# Patient Record
Sex: MALE | Race: BLACK OR AFRICAN AMERICAN | NOT HISPANIC OR LATINO | Employment: OTHER | URBAN - METROPOLITAN AREA
[De-identification: names, ages, dates, MRNs, and addresses within clinical notes are randomized per-mention and may not be internally consistent; named-entity substitution may affect disease eponyms.]

---

## 2019-01-02 ENCOUNTER — HOSPITAL ENCOUNTER (OUTPATIENT)
Facility: HOSPITAL | Age: 59
Discharge: HOME | End: 2019-01-03
Attending: EMERGENCY MEDICINE | Admitting: EMERGENCY MEDICINE
Payer: COMMERCIAL

## 2019-01-02 DIAGNOSIS — M19.90 CHRONIC ARTHRITIS: ICD-10-CM

## 2019-01-02 DIAGNOSIS — N17.9 AKI (ACUTE KIDNEY INJURY): Primary | ICD-10-CM

## 2019-01-02 DIAGNOSIS — N17.9 ACUTE RENAL FAILURE, UNSPECIFIED ACUTE RENAL FAILURE TYPE: ICD-10-CM

## 2019-01-02 DIAGNOSIS — E86.0 DEHYDRATION: ICD-10-CM

## 2019-01-02 DIAGNOSIS — E83.52 HYPERCALCEMIA: ICD-10-CM

## 2019-01-02 PROBLEM — M25.50 ARTHRALGIA: Status: ACTIVE | Noted: 2019-01-02

## 2019-01-02 LAB
ALBUMIN SERPL BCP-MCNC: 3.2 G/DL
ALP SERPL-CCNC: 136 U/L
ALT SERPL W/O P-5'-P-CCNC: 42 U/L
AMORPH CRY URNS QL MICRO: ABNORMAL
AMPHET+METHAMPHET UR QL: NEGATIVE
ANION GAP SERPL CALC-SCNC: 11 MMOL/L
ANION GAP SERPL CALC-SCNC: 18 MMOL/L
AST SERPL-CCNC: 50 U/L
BACTERIA #/AREA URNS HPF: ABNORMAL /HPF
BARBITURATES UR QL SCN>200 NG/ML: NEGATIVE
BASOPHILS # BLD AUTO: 0.01 K/UL
BASOPHILS NFR BLD: 0.1 %
BENZODIAZ UR QL SCN>200 NG/ML: NEGATIVE
BILIRUB SERPL-MCNC: 0.4 MG/DL
BILIRUB UR QL STRIP: ABNORMAL
BUN SERPL-MCNC: 55 MG/DL
BUN SERPL-MCNC: 61 MG/DL
BZE UR QL SCN: NEGATIVE
CALCIUM SERPL-MCNC: 10.7 MG/DL
CALCIUM SERPL-MCNC: 12.6 MG/DL
CANNABINOIDS UR QL SCN: NEGATIVE
CHLORIDE SERPL-SCNC: 104 MMOL/L
CHLORIDE SERPL-SCNC: 105 MMOL/L
CK SERPL-CCNC: 23 U/L
CLARITY UR: CLEAR
CO2 SERPL-SCNC: 16 MMOL/L
CO2 SERPL-SCNC: 19 MMOL/L
COLOR UR: YELLOW
CREAT SERPL-MCNC: 2 MG/DL
CREAT SERPL-MCNC: 2.1 MG/DL
CREAT UR-MCNC: 165.4 MG/DL
DIFFERENTIAL METHOD: ABNORMAL
EOSINOPHIL # BLD AUTO: 0.1 K/UL
EOSINOPHIL NFR BLD: 1.5 %
ERYTHROCYTE [DISTWIDTH] IN BLOOD BY AUTOMATED COUNT: 14.5 %
EST. GFR  (AFRICAN AMERICAN): 39 ML/MIN/1.73 M^2
EST. GFR  (AFRICAN AMERICAN): 42 ML/MIN/1.73 M^2
EST. GFR  (NON AFRICAN AMERICAN): 34 ML/MIN/1.73 M^2
EST. GFR  (NON AFRICAN AMERICAN): 36 ML/MIN/1.73 M^2
ETHANOL SERPL-MCNC: <10 MG/DL
GLUCOSE SERPL-MCNC: 103 MG/DL
GLUCOSE SERPL-MCNC: 129 MG/DL
GLUCOSE UR QL STRIP: NEGATIVE
GRAN CASTS #/AREA URNS LPF: 1 /LPF
HCT VFR BLD AUTO: 31.6 %
HGB BLD-MCNC: 10.6 G/DL
HGB UR QL STRIP: ABNORMAL
HYALINE CASTS #/AREA URNS LPF: 0 /LPF
KETONES UR QL STRIP: ABNORMAL
LEUKOCYTE ESTERASE UR QL STRIP: NEGATIVE
LYMPHOCYTES # BLD AUTO: 1.7 K/UL
LYMPHOCYTES NFR BLD: 19.8 %
MAGNESIUM SERPL-MCNC: 2.7 MG/DL
MCH RBC QN AUTO: 28.3 PG
MCHC RBC AUTO-ENTMCNC: 33.5 G/DL
MCV RBC AUTO: 84 FL
METHADONE UR QL SCN>300 NG/ML: NEGATIVE
MICROSCOPIC COMMENT: ABNORMAL
MONOCYTES # BLD AUTO: 0.7 K/UL
MONOCYTES NFR BLD: 7.7 %
NEUTROPHILS # BLD AUTO: 6.1 K/UL
NEUTROPHILS NFR BLD: 70.9 %
NITRITE UR QL STRIP: NEGATIVE
OPIATES UR QL SCN: NEGATIVE
PCP UR QL SCN>25 NG/ML: NEGATIVE
PH UR STRIP: 6 [PH] (ref 5–8)
PHOSPHATE SERPL-MCNC: 5.1 MG/DL
PLATELET # BLD AUTO: 341 K/UL
PMV BLD AUTO: 11 FL
POTASSIUM SERPL-SCNC: 4.8 MMOL/L
POTASSIUM SERPL-SCNC: 4.9 MMOL/L
PROT SERPL-MCNC: 9.4 G/DL
PROT UR QL STRIP: ABNORMAL
RBC # BLD AUTO: 3.75 M/UL
RBC #/AREA URNS HPF: 0 /HPF (ref 0–4)
SODIUM SERPL-SCNC: 135 MMOL/L
SODIUM SERPL-SCNC: 138 MMOL/L
SP GR UR STRIP: >=1.03 (ref 1–1.03)
TOXICOLOGY INFORMATION: NORMAL
URN SPEC COLLECT METH UR: ABNORMAL
UROBILINOGEN UR STRIP-ACNC: NEGATIVE EU/DL
WBC # BLD AUTO: 8.59 K/UL
WBC #/AREA URNS HPF: 0 /HPF (ref 0–5)

## 2019-01-02 PROCEDURE — 97530 THERAPEUTIC ACTIVITIES: CPT | Performed by: PHYSICAL THERAPIST

## 2019-01-02 PROCEDURE — 83735 ASSAY OF MAGNESIUM: CPT

## 2019-01-02 PROCEDURE — G8987 SELF CARE CURRENT STATUS: HCPCS | Mod: CK | Performed by: PHYSICAL THERAPIST

## 2019-01-02 PROCEDURE — 25000003 PHARM REV CODE 250: Performed by: NURSE PRACTITIONER

## 2019-01-02 PROCEDURE — 97161 PT EVAL LOW COMPLEX 20 MIN: CPT

## 2019-01-02 PROCEDURE — 80048 BASIC METABOLIC PNL TOTAL CA: CPT

## 2019-01-02 PROCEDURE — 25000003 PHARM REV CODE 250: Performed by: EMERGENCY MEDICINE

## 2019-01-02 PROCEDURE — 99285 EMERGENCY DEPT VISIT HI MDM: CPT | Mod: 25

## 2019-01-02 PROCEDURE — 80320 DRUG SCREEN QUANTALCOHOLS: CPT

## 2019-01-02 PROCEDURE — G0378 HOSPITAL OBSERVATION PER HR: HCPCS

## 2019-01-02 PROCEDURE — 80053 COMPREHEN METABOLIC PANEL: CPT

## 2019-01-02 PROCEDURE — G8988 SELF CARE GOAL STATUS: HCPCS | Mod: CJ | Performed by: PHYSICAL THERAPIST

## 2019-01-02 PROCEDURE — G8978 MOBILITY CURRENT STATUS: HCPCS | Mod: CJ

## 2019-01-02 PROCEDURE — 81000 URINALYSIS NONAUTO W/SCOPE: CPT | Mod: 59

## 2019-01-02 PROCEDURE — 36415 COLL VENOUS BLD VENIPUNCTURE: CPT

## 2019-01-02 PROCEDURE — 96360 HYDRATION IV INFUSION INIT: CPT

## 2019-01-02 PROCEDURE — 97165 OT EVAL LOW COMPLEX 30 MIN: CPT | Performed by: PHYSICAL THERAPIST

## 2019-01-02 PROCEDURE — 85025 COMPLETE CBC W/AUTO DIFF WBC: CPT

## 2019-01-02 PROCEDURE — 96361 HYDRATE IV INFUSION ADD-ON: CPT

## 2019-01-02 PROCEDURE — 80307 DRUG TEST PRSMV CHEM ANLYZR: CPT

## 2019-01-02 PROCEDURE — 82550 ASSAY OF CK (CPK): CPT

## 2019-01-02 PROCEDURE — G8979 MOBILITY GOAL STATUS: HCPCS | Mod: CI

## 2019-01-02 PROCEDURE — 97116 GAIT TRAINING THERAPY: CPT

## 2019-01-02 PROCEDURE — 84100 ASSAY OF PHOSPHORUS: CPT

## 2019-01-02 RX ORDER — FAMOTIDINE 20 MG/1
20 TABLET, FILM COATED ORAL 2 TIMES DAILY
Status: DISCONTINUED | OUTPATIENT
Start: 2019-01-02 | End: 2019-01-02

## 2019-01-02 RX ORDER — SODIUM CHLORIDE 9 MG/ML
INJECTION, SOLUTION INTRAVENOUS CONTINUOUS
Status: DISCONTINUED | OUTPATIENT
Start: 2019-01-02 | End: 2019-01-03 | Stop reason: HOSPADM

## 2019-01-02 RX ORDER — RAMELTEON 8 MG/1
8 TABLET ORAL NIGHTLY PRN
Status: DISCONTINUED | OUTPATIENT
Start: 2019-01-02 | End: 2019-01-03 | Stop reason: HOSPADM

## 2019-01-02 RX ORDER — DULOXETIN HYDROCHLORIDE 60 MG/1
CAPSULE, DELAYED RELEASE ORAL
COMMUNITY
Start: 2018-10-29

## 2019-01-02 RX ORDER — IBUPROFEN 100 MG/5ML
1000 SUSPENSION, ORAL (FINAL DOSE FORM) ORAL
COMMUNITY

## 2019-01-02 RX ORDER — HYDROCODONE BITARTRATE AND ACETAMINOPHEN 10; 325 MG/1; MG/1
1 TABLET ORAL
Status: COMPLETED | OUTPATIENT
Start: 2019-01-02 | End: 2019-01-02

## 2019-01-02 RX ORDER — HYDROXYZINE HYDROCHLORIDE 50 MG/1
TABLET, FILM COATED ORAL
COMMUNITY
Start: 2018-11-01

## 2019-01-02 RX ORDER — NAPROXEN 500 MG/1
500 TABLET ORAL
Status: COMPLETED | OUTPATIENT
Start: 2019-01-02 | End: 2019-01-02

## 2019-01-02 RX ORDER — QUETIAPINE FUMARATE 50 MG/1
TABLET, FILM COATED ORAL
COMMUNITY
Start: 2018-10-29

## 2019-01-02 RX ORDER — ASCORBIC ACID 500 MG
1000 TABLET ORAL DAILY
Status: DISCONTINUED | OUTPATIENT
Start: 2019-01-02 | End: 2019-01-03 | Stop reason: HOSPADM

## 2019-01-02 RX ORDER — AMLODIPINE BESYLATE 5 MG/1
TABLET ORAL
COMMUNITY
Start: 2018-10-29

## 2019-01-02 RX ORDER — HYDROCODONE BITARTRATE AND ACETAMINOPHEN 5; 325 MG/1; MG/1
1 TABLET ORAL EVERY 4 HOURS PRN
Status: DISCONTINUED | OUTPATIENT
Start: 2019-01-02 | End: 2019-01-03 | Stop reason: HOSPADM

## 2019-01-02 RX ORDER — DULOXETIN HYDROCHLORIDE 30 MG/1
60 CAPSULE, DELAYED RELEASE ORAL DAILY
Status: DISCONTINUED | OUTPATIENT
Start: 2019-01-02 | End: 2019-01-03 | Stop reason: HOSPADM

## 2019-01-02 RX ORDER — ESZOPICLONE 1 MG/1
3 TABLET, FILM COATED ORAL NIGHTLY PRN
COMMUNITY

## 2019-01-02 RX ORDER — ONDANSETRON 2 MG/ML
4 INJECTION INTRAMUSCULAR; INTRAVENOUS EVERY 12 HOURS PRN
Status: DISCONTINUED | OUTPATIENT
Start: 2019-01-02 | End: 2019-01-03 | Stop reason: HOSPADM

## 2019-01-02 RX ORDER — MEGESTROL ACETATE 40 MG/1
40 TABLET ORAL 2 TIMES DAILY
COMMUNITY
Start: 2018-06-13

## 2019-01-02 RX ORDER — ACETAMINOPHEN 325 MG/1
650 TABLET ORAL EVERY 8 HOURS PRN
Status: DISCONTINUED | OUTPATIENT
Start: 2019-01-02 | End: 2019-01-03 | Stop reason: HOSPADM

## 2019-01-02 RX ORDER — MORPHINE SULFATE 4 MG/ML
4 INJECTION, SOLUTION INTRAMUSCULAR; INTRAVENOUS EVERY 4 HOURS PRN
Status: DISCONTINUED | OUTPATIENT
Start: 2019-01-02 | End: 2019-01-03 | Stop reason: HOSPADM

## 2019-01-02 RX ORDER — TIZANIDINE HYDROCHLORIDE 4 MG/1
4 CAPSULE, GELATIN COATED ORAL 2 TIMES DAILY PRN
COMMUNITY
Start: 2018-06-13

## 2019-01-02 RX ORDER — FAMOTIDINE 20 MG/1
20 TABLET, FILM COATED ORAL DAILY
Status: DISCONTINUED | OUTPATIENT
Start: 2019-01-03 | End: 2019-01-03 | Stop reason: HOSPADM

## 2019-01-02 RX ORDER — QUETIAPINE FUMARATE 25 MG/1
50 TABLET, FILM COATED ORAL NIGHTLY
Status: DISCONTINUED | OUTPATIENT
Start: 2019-01-02 | End: 2019-01-03 | Stop reason: HOSPADM

## 2019-01-02 RX ORDER — HYDROCODONE BITARTRATE AND ACETAMINOPHEN 10; 325 MG/1; MG/1
1 TABLET ORAL EVERY 8 HOURS PRN
Status: DISCONTINUED | OUTPATIENT
Start: 2019-01-02 | End: 2019-01-03 | Stop reason: HOSPADM

## 2019-01-02 RX ORDER — AMOXICILLIN 250 MG
1 CAPSULE ORAL 2 TIMES DAILY
Status: DISCONTINUED | OUTPATIENT
Start: 2019-01-02 | End: 2019-01-03 | Stop reason: HOSPADM

## 2019-01-02 RX ORDER — HYDROCODONE BITARTRATE AND ACETAMINOPHEN 10; 325 MG/1; MG/1
1 TABLET ORAL EVERY 8 HOURS PRN
COMMUNITY
Start: 2016-06-27

## 2019-01-02 RX ADMIN — SODIUM CHLORIDE 1000 ML: 0.9 INJECTION, SOLUTION INTRAVENOUS at 04:01

## 2019-01-02 RX ADMIN — STANDARDIZED SENNA CONCENTRATE AND DOCUSATE SODIUM 1 TABLET: 8.6; 5 TABLET, FILM COATED ORAL at 09:01

## 2019-01-02 RX ADMIN — HYDROCODONE BITARTRATE AND ACETAMINOPHEN 1 TABLET: 10; 325 TABLET ORAL at 09:01

## 2019-01-02 RX ADMIN — SODIUM CHLORIDE 1000 ML: 0.9 INJECTION, SOLUTION INTRAVENOUS at 05:01

## 2019-01-02 RX ADMIN — QUETIAPINE FUMARATE 50 MG: 25 TABLET ORAL at 09:01

## 2019-01-02 RX ADMIN — FAMOTIDINE 20 MG: 20 TABLET ORAL at 09:01

## 2019-01-02 RX ADMIN — OXYCODONE HYDROCHLORIDE AND ACETAMINOPHEN 1000 MG: 500 TABLET ORAL at 09:01

## 2019-01-02 RX ADMIN — DULOXETINE 60 MG: 30 CAPSULE, DELAYED RELEASE ORAL at 09:01

## 2019-01-02 RX ADMIN — HYDROCODONE BITARTRATE AND ACETAMINOPHEN 1 TABLET: 10; 325 TABLET ORAL at 08:01

## 2019-01-02 RX ADMIN — SODIUM CHLORIDE: 0.9 INJECTION, SOLUTION INTRAVENOUS at 01:01

## 2019-01-02 RX ADMIN — HYDROCODONE BITARTRATE AND ACETAMINOPHEN 1 TABLET: 10; 325 TABLET ORAL at 11:01

## 2019-01-02 RX ADMIN — RAMELTEON 8 MG: 8 TABLET, FILM COATED ORAL at 09:01

## 2019-01-02 RX ADMIN — NAPROXEN 500 MG: 500 TABLET ORAL at 04:01

## 2019-01-02 RX ADMIN — SODIUM CHLORIDE: 0.9 INJECTION, SOLUTION INTRAVENOUS at 09:01

## 2019-01-02 NOTE — H&P
[facility] Hospital Medicine  History & Physical    Patient Name: Travis Abreu  MRN: [id number]  Admission Date: 1/2/2019  Attending Physician: Meri Herrera MD   Primary Care Provider: Primary Doctor No         Patient information was obtained from patient and ER records.     Subjective:     Principal Problem:EMY (acute kidney injury)    Chief Complaint:   Chief Complaint   Patient presents with    Knee Pain        HPI: Mr Abreu is a 58 year old male with PMHx of arthritis, stroke, Lt hip replacement and HTN who presented to [facility] Emergency Room with increase arthritic pain that was worsen over the pass few days to a week. He reports unable to get out of bed due to increase pain and was unable to get food or water. Pain is primary to the Rt knee and lower extremities. Denies any recent injury or trama. Denies associated symptoms of chest pain, shortness of breath, syncope, dizziness, nausea, fever, chills, nausea or vomiting. Creatinine 2.1 today, however Care Everywhere shows creatinine of 1.12 on  7/12/2018. Calcium level 12.1 today, level was 11.1 on 6/2018. Patient reports NSAID has cause him GI bleeding in the pain. He reports has seen chronic pain physician [place].         Past Medical History:   Diagnosis Date    Anxiety     Arthritis     Depression     HTN (hypertension)     Stroke 2008       Past Surgical History:   Procedure Laterality Date    left hip replacement         Review of patient's allergies indicates:   Allergen Reactions    Penicillins Other (See Comments) and Shortness Of Breath     Agitation  Occurred at age 15    Methocarbamol      Weird out , muscles,       No current facility-administered medications on file prior to encounter.      Current Outpatient Medications on File Prior to Encounter   Medication Sig    amLODIPine (NORVASC) 5 MG tablet TAKE ONE TABLET BY MOUTH ONE TIME DAILY    ascorbic acid, vitamin C, (VITAMIN C) 1000 MG tablet Take 1,000 mg  by mouth.    DULoxetine (CYMBALTA) 60 MG capsule TAKE ONE CAPSULE BY MOUTH ONE TIME DAILY    eszopiclone (LUNESTA) 1 MG Tab Take 3 mg by mouth nightly as needed.    HYDROcodone-acetaminophen (NORCO)  mg per tablet Take 1 tablet by mouth every 8 (eight) hours as needed.     hydrOXYzine (ATARAX) 50 MG tablet TAKE ONE TABLET BY MOUTH AT BEDTIME for itching    megestrol (MEGACE) 40 MG Tab Take 40 mg by mouth 2 (two) times daily .    QUEtiapine (SEROQUEL) 50 MG tablet TAKE 1 TABLET BY MOUTH NIGHTLY    tiZANidine (ZANAFLEX) 4 mg Cap Take 4 mg by mouth 2 (two) times daily as needed.     Family History     Problem Relation (Age of Onset)    Heart disease Mother, Brother    Spondylolisthesis Father        Tobacco Use    Smoking status: Never Smoker    Smokeless tobacco: Never Used   Substance and Sexual Activity    Alcohol use: No     Frequency: Never     Comment: recoving, last drink 10 year ago     Drug use: No     Comment: recoving, cocaine use 10 years ago     Sexual activity: Not on file     Review of Systems   Constitutional: Negative for chills, diaphoresis, fatigue and fever.   HENT: Negative for congestion, ear discharge, rhinorrhea, sinus pressure, sore throat and trouble swallowing.    Eyes: Negative for discharge and visual disturbance.   Respiratory: Negative for apnea, cough, choking, chest tightness, shortness of breath, wheezing and stridor.    Cardiovascular: Negative for chest pain, palpitations and leg swelling.   Gastrointestinal: Negative for abdominal distention, abdominal pain, diarrhea, nausea and vomiting.   Endocrine: Negative for cold intolerance and heat intolerance.   Genitourinary: Negative for dysuria, frequency and hematuria.   Musculoskeletal: Positive for arthralgias. Negative for back pain, myalgias and neck pain.        Right knee pain   Lt wrist pain and bilateral lower extremely pain      Skin: Negative for pallor and rash.   Neurological: Negative for dizziness,  seizures, syncope, weakness and headaches.   Psychiatric/Behavioral: Negative for agitation, confusion and sleep disturbance.     Objective:     Vital Signs (Most Recent):  Temp: 98.2 °F (36.8 °C) (01/02/19 0909)  Pulse: 93 (01/02/19 0909)  Resp: 17 (01/02/19 0909)  BP: (!) 143/83 (01/02/19 0909)  SpO2: 100 % (01/02/19 0909) Vital Signs (24h Range):  Temp:  [98.1 °F (36.7 °C)-98.3 °F (36.8 °C)] 98.2 °F (36.8 °C)  Pulse:  [] 93  Resp:  [17-20] 17  SpO2:  [96 %-100 %] 100 %  BP: (111-143)/(75-83) 143/83     Weight: 69.4 kg (153 lb 1.6 oz)  Body mass index is 21.97 kg/m².    Physical Exam   Constitutional: He is oriented to person, place, and time. He appears cachectic. No distress.   HENT:   Head: Normocephalic and atraumatic.   Chronic right side facial droop    Eyes: Right eye exhibits no discharge. Left eye exhibits no discharge.   Neck: No JVD present.   Cardiovascular: Exam reveals no gallop and no friction rub.   No murmur heard.  Pulmonary/Chest: No stridor. No respiratory distress. He has no wheezes. He has no rales. He exhibits no tenderness.   Abdominal: He exhibits no distension and no mass. There is no tenderness. There is no rebound and no guarding. No hernia.   Musculoskeletal: He exhibits tenderness. He exhibits no edema or deformity.   Reports Increase Rt knee pain.   Also reports left wrist pain and bilateral lower extremities pain.  No redness or swelling noted.    Neurological: He is alert and oriented to person, place, and time.   Skin: Skin is warm. Capillary refill takes less than 2 seconds. He is not diaphoretic.   Psychiatric: He has a normal mood and affect. His behavior is normal. Judgment and thought content normal.   Nursing note and vitals reviewed.          Significant Labs:   CBC:   Recent Labs   Lab 01/02/19  0407   WBC 8.59   HGB 10.6*   HCT 31.6*        CMP:   Recent Labs   Lab 01/02/19  0407      K 4.8      CO2 16*      BUN 61*   CREATININE 2.1*    CALCIUM 12.6*   PROT 9.4*   ALBUMIN 3.2*   BILITOT 0.4   ALKPHOS 136*   AST 50*   ALT 42   ANIONGAP 18*   EGFRNONAA 34*     Cardiac Markers: No results for input(s): CKMB, MYOGLOBIN, BNP, TROPISTAT in the last 48 hours.  Magnesium:   Recent Labs   Lab 01/02/19  0407   MG 2.7*     Troponin: No results for input(s): TROPONINI in the last 48 hours.  Urine Studies:   Recent Labs   Lab 01/02/19  0702   COLORU Yellow   APPEARANCEUA Clear   PHUR 6.0   SPECGRAV >=1.030*   PROTEINUA 2+*   GLUCUA Negative   KETONESU 1+*   BILIRUBINUA 1+*   OCCULTUA 1+*   NITRITE Negative   UROBILINOGEN Negative   LEUKOCYTESUR Negative   RBCUA 0   WBCUA 0   BACTERIA Occasional   HYALINECASTS 0       Significant Imaging:         Assessment/Plan:     * EMY (acute kidney injury)    Place in observation  Creatinine 2.1 today, however Care Everywhere shows creatinine of 1.12 on  7/12/2018, probable related to dehydration.  IVF   Monitor renal function        Dehydration      As above      Arthralgia    Continue home medications   Follow up with rheumatology as outpatient            Hypercalcemia    Calcium level 12.6, however Ca level was 11.1 on 6/13/2018.   May be chronic   Denies any recent intake of Calcium supplement or Tums   IVF   Monitor level closely          VTE Risk Mitigation (From admission, onward)        Ordered     IP VTE LOW RISK PATIENT  Once      01/02/19 0933     Place SHANICE hose  Until discontinued      01/02/19 0933     Place sequential compression device  Until discontinued      01/02/19 0930             Georges Forman NP  Department of Hospital Medicine   [facility]

## 2019-01-02 NOTE — PLAN OF CARE
01/02/19 1330   HUDSON Message   Medicare Outpatient and Observation Notification regarding financial responsibility Given to patient/caregiver;Explained to patient/caregiver;Signed/date by patient/caregiver   Date HUDSON was signed 01/02/19   Time HUDSON was signed [id number]

## 2019-01-02 NOTE — PT/OT/SLP PROGRESS
Physical Therapy      Patient Name:  Travis Abreu   MRN:  [id number]    P.T. EVAL AND TX COMPLETED. P.T. RECOMMENDS  P.T. AND RW AT D/C. COMPLETE P.T. EVAL TO FOLLOW. THANK YOU     Violeta Gutierrez, PT,1/2/2019

## 2019-01-02 NOTE — PT/OT/SLP EVAL
Occupational Therapy   Evaluation    Name: Travis Abreu  MRN: [id number]  Admitting Diagnosis:  EMY (acute kidney injury)      Recommendations:     Discharge Recommendations: other (see comments)(home health)  Discharge Equipment Recommendations:  none  Barriers to discharge:       History:     Occupational Profile:  Living Environment: lives alone in 2nd floor townhouse with 10 steps   Previous level of function: (I) with ADLs and ambulation  Roles and Routines: Drives, does not work  Equipment Used at Home:  cane, straight  Assistance upon Discharge: unknown    Past Medical History:   Diagnosis Date    Anxiety     Arthritis     Depression     HTN (hypertension)     Stroke 2008       Past Surgical History:   Procedure Laterality Date    left hip replacement         Subjective     Chief Complaint: Leg pain  Patient/Family Comments/goals: to be as independent as possible     Pain/Comfort:  · Pain Rating 1: 3/10  · Location - Side 1: Bilateral  · Location 1: leg  · Pain Addressed 1: Reposition, Distraction  · Pain Rating Post-Intervention 1: 3/10    Patients cultural, spiritual, Jew conflicts given the current situation: no    Objective:     Communicated with: Nurse Knox and [facility] chart review prior to session.  Patient found supine in bed with: all lines intact and call button in reach and peripheral IV, telemetry upon OT entry to room.    General Precautions: Standard, fall   Orthopedic Precautions:N/A   Braces: N/A     Occupational Performance:    Bed Mobility:    · Patient completed Rolling/Turning to Left with  stand by assistance  · Patient completed Rolling/Turning to Right with stand by assistance  · Patient completed Scooting/Bridging with stand by assistance  · Patient completed Supine to Sit with stand by assistance    Functional Mobility/Transfers:  · Patient completed Sit <> Stand Transfer with contact guard assistance  with  rolling walker   · Patient completed Bed <> Chair Transfer using  "Step Transfer technique with contact guard assistance with rolling walker  · Functional Mobility: ~60ft using RW with CGA    Activities of Daily Living:  · Upper Body Dressing: stand by assistance .  · Lower Body Dressing: stand by assistance .    Cognitive/Visual Perceptual:  Cognitive/Psychosocial Skills:     -       Oriented to: Person, Place, Time and Situation   -       Follows Commands/attention:Follows multistep  commands  -       Communication: clear/fluent  -       Memory: No Deficits noted  -       Safety awareness/insight to disability: intact   Visual/Perceptual:      -Intact .    Physical Exam:  Dominant hand:    -       right  Upper Extremity Range of Motion:     -       Right Upper Extremity: WFL  -       Left Upper Extremity: WFL  Upper Extremity Strength:    -       Right Upper Extremity: 4/5 grossly  -       Left Upper Extremity: 4/5 grossly   Strength:    -       Right Upper Extremity: WFL  -       Left Upper Extremity: WFL    AMPAC 6 Click ADL:  AMPAC Total Score: 19      OT eval completed as listed above. Pt educated in role of OT and POC.  Education:    Patient left sitting on sofa in room with all lines intact, call button in reach and Nurse Abilio notified    Assessment:     Travis Abreu is a 58 y.o. male with a medical diagnosis of EMY (acute kidney injury).  He presents with the following performance deficits affecting function: weakness, impaired endurance, impaired self care skills, impaired functional mobilty, decreased coordination, gait instability, impaired balance, decreased lower extremity function, decreased safety awareness, pain, decreased ROM.      Rehab Prognosis: Good; patient would benefit from acute skilled OT services to address these deficits and reach maximum level of function.         Clinical Decision Makin.  OT Mod:  "Pt evaluation falls under moderate complexity for evaluation coding due to identification of 3-5 performance deficits noted as stated above. " "Eval required Min/Mod assistance to complete on this date and detailed assessment(s) were utilized. Moreover, an expanded review of history and occupational profile obtained with additional review of cognitive, physical and psychosocial hx."     Plan:     Patient to be seen 3 x/week(Pt to be seen a minimum of 3 out of 7 days a week for skilled OT) to address the above listed problems via self-care/home management, therapeutic activities, therapeutic exercises  · Plan of Care Expires: 01/09/19  · Plan of Care Reviewed with: patient    This Plan of care has been discussed with the patient who was involved in its development and understands and is in agreement with the identified goals and treatment plan    GOALS:   Multidisciplinary Problems     Occupational Therapy Goals        Problem: Occupational Therapy Goal    Goal Priority Disciplines Outcome Interventions   Occupational Therapy Goal     OT, PT/OT     Description:  Pt to be seen a minimum of 3 out of 7 days a week for skilled OT  LTGs to be Met by 1/9/19  1. Pt will perform bed mobility with Mod (I)   2. Pt will perform commode t/fs with Mod (I)  3. Pt will perform UE and LE dressing with Supervision  4. Pt will tolerate (B) UE ROM exercises 1 x 20 reps                    Time Tracking:     OT Date of Treatment: 01/02/19  OT Start Time: 1035  OT Stop Time: 1100  OT Total Time (min): 25 min    Billable Minutes:Evaluation 15 min  Therapeutic Activity 10 min    Leonarda Benson, PT/OT  1/2/2019    "

## 2019-01-02 NOTE — NURSING
Patient sitting up on couch after ambulating with physical therapy. Instructed to call for assistance.

## 2019-01-02 NOTE — ASSESSMENT & PLAN NOTE
Place in observation  Creatinine 2.1 today, however Care Everywhere shows creatinine of 1.12 on  7/12/2018, probable related to dehydration.  IVF   Monitor renal function

## 2019-01-02 NOTE — HPI
Mr Abreu is a 58 year old male with PMHx of arthritis, stroke, Lt hip replacement and HTN who presented to [facility] Room with increase arthritic pain that was worsen over the pass few days to a week. He reports unable to get out of bed due to increase pain and was unable to get food or water. Pain is primary to the Rt knee and lower extremities. Denies any recent injury or trama. Denies associated symptoms of chest pain, shortness of breath, syncope, dizziness, nausea, fever, chills, nausea or vomiting. Creatinine 2.1 today, however Care Everywhere shows creatinine of 1.12 on  7/12/2018. Calcium level 12.1 today, level was 11.1 on 6/2018. Patient reports NSAID has cause him GI bleeding in the pain. He reports has seen chronic pain physician [place] the [place].

## 2019-01-02 NOTE — SUBJECTIVE & OBJECTIVE
Past Medical History:   Diagnosis Date    Anxiety     Arthritis     Depression     HTN (hypertension)     Stroke 2008       Past Surgical History:   Procedure Laterality Date    left hip replacement         Review of patient's allergies indicates:   Allergen Reactions    Penicillins Other (See Comments) and Shortness Of Breath     Agitation  Occurred at age 15    Methocarbamol      Weird out , muscles,       No current facility-administered medications on file prior to encounter.      Current Outpatient Medications on File Prior to Encounter   Medication Sig    amLODIPine (NORVASC) 5 MG tablet TAKE ONE TABLET BY MOUTH ONE TIME DAILY    ascorbic acid, vitamin C, (VITAMIN C) 1000 MG tablet Take 1,000 mg by mouth.    DULoxetine (CYMBALTA) 60 MG capsule TAKE ONE CAPSULE BY MOUTH ONE TIME DAILY    eszopiclone (LUNESTA) 1 MG Tab Take 3 mg by mouth nightly as needed.    HYDROcodone-acetaminophen (NORCO)  mg per tablet Take 1 tablet by mouth every 8 (eight) hours as needed.     hydrOXYzine (ATARAX) 50 MG tablet TAKE ONE TABLET BY MOUTH AT BEDTIME for itching    megestrol (MEGACE) 40 MG Tab Take 40 mg by mouth 2 (two) times daily .    QUEtiapine (SEROQUEL) 50 MG tablet TAKE 1 TABLET BY MOUTH NIGHTLY    tiZANidine (ZANAFLEX) 4 mg Cap Take 4 mg by mouth 2 (two) times daily as needed.     Family History     Problem Relation (Age of Onset)    Heart disease Mother, Brother    Spondylolisthesis Father        Tobacco Use    Smoking status: Never Smoker    Smokeless tobacco: Never Used   Substance and Sexual Activity    Alcohol use: No     Frequency: Never     Comment: recoving, last drink 10 year ago     Drug use: No     Comment: recoving, cocaine use 10 years ago     Sexual activity: Not on file     Review of Systems   Constitutional: Negative for chills, diaphoresis, fatigue and fever.   HENT: Negative for congestion, ear discharge, rhinorrhea, sinus pressure, sore throat and trouble swallowing.     Eyes: Negative for discharge and visual disturbance.   Respiratory: Negative for apnea, cough, choking, chest tightness, shortness of breath, wheezing and stridor.    Cardiovascular: Negative for chest pain, palpitations and leg swelling.   Gastrointestinal: Negative for abdominal distention, abdominal pain, diarrhea, nausea and vomiting.   Endocrine: Negative for cold intolerance and heat intolerance.   Genitourinary: Negative for dysuria, frequency and hematuria.   Musculoskeletal: Positive for arthralgias. Negative for back pain, myalgias and neck pain.        Right knee pain   Lt wrist pain and bilateral lower extremely pain      Skin: Negative for pallor and rash.   Neurological: Negative for dizziness, seizures, syncope, weakness and headaches.   Psychiatric/Behavioral: Negative for agitation, confusion and sleep disturbance.     Objective:     Vital Signs (Most Recent):  Temp: 98.2 °F (36.8 °C) (01/02/19 0909)  Pulse: 93 (01/02/19 0909)  Resp: 17 (01/02/19 0909)  BP: (!) 143/83 (01/02/19 0909)  SpO2: 100 % (01/02/19 0909) Vital Signs (24h Range):  Temp:  [98.1 °F (36.7 °C)-98.3 °F (36.8 °C)] 98.2 °F (36.8 °C)  Pulse:  [] 93  Resp:  [17-20] 17  SpO2:  [96 %-100 %] 100 %  BP: (111-143)/(75-83) 143/83     Weight: 69.4 kg (153 lb 1.6 oz)  Body mass index is 21.97 kg/m².    Physical Exam   Constitutional: He is oriented to person, place, and time. He appears cachectic. No distress.   HENT:   Head: Normocephalic and atraumatic.   Chronic right side facial droop    Eyes: Right eye exhibits no discharge. Left eye exhibits no discharge.   Neck: No JVD present.   Cardiovascular: Exam reveals no gallop and no friction rub.   No murmur heard.  Pulmonary/Chest: No stridor. No respiratory distress. He has no wheezes. He has no rales. He exhibits no tenderness.   Abdominal: He exhibits no distension and no mass. There is no tenderness. There is no rebound and no guarding. No hernia.   Musculoskeletal: He exhibits  tenderness. He exhibits no edema or deformity.   Reports Increase Rt knee pain.   Also reports left wrist pain and bilateral lower extremities pain.  No redness or swelling noted.    Neurological: He is alert and oriented to person, place, and time.   Skin: Skin is warm. Capillary refill takes less than 2 seconds. He is not diaphoretic.   Psychiatric: He has a normal mood and affect. His behavior is normal. Judgment and thought content normal.   Nursing note and vitals reviewed.          Significant Labs:   CBC:   Recent Labs   Lab 01/02/19  0407   WBC 8.59   HGB 10.6*   HCT 31.6*        CMP:   Recent Labs   Lab 01/02/19  0407      K 4.8      CO2 16*      BUN 61*   CREATININE 2.1*   CALCIUM 12.6*   PROT 9.4*   ALBUMIN 3.2*   BILITOT 0.4   ALKPHOS 136*   AST 50*   ALT 42   ANIONGAP 18*   EGFRNONAA 34*     Cardiac Markers: No results for input(s): CKMB, MYOGLOBIN, BNP, TROPISTAT in the last 48 hours.  Magnesium:   Recent Labs   Lab 01/02/19  0407   MG 2.7*     Troponin: No results for input(s): TROPONINI in the last 48 hours.  Urine Studies:   Recent Labs   Lab 01/02/19  0702   COLORU Yellow   APPEARANCEUA Clear   PHUR 6.0   SPECGRAV >=1.030*   PROTEINUA 2+*   GLUCUA Negative   KETONESU 1+*   BILIRUBINUA 1+*   OCCULTUA 1+*   NITRITE Negative   UROBILINOGEN Negative   LEUKOCYTESUR Negative   RBCUA 0   WBCUA 0   BACTERIA Occasional   HYALINECASTS 0       Significant Imaging:

## 2019-01-02 NOTE — PT/OT/SLP EVAL
Physical Therapy Evaluation    Patient Name:  Travis Abreu   MRN:  [id number]    Recommendations:     Discharge Recommendations:  (hh p.t. )   Discharge Equipment Recommendations: none   Barriers to discharge: None    Assessment:     Travis Abreu is a 58 y.o. male admitted with a medical diagnosis of EMY (acute kidney injury).  He presents with the following impairments/functional limitations:  weakness, impaired endurance, impaired functional mobilty, gait instability, decreased lower extremity function, decreased upper extremity function, pain, impaired balance, impaired self care skills, decreased ROM .    Rehab Prognosis: Good; patient would benefit from acute skilled PT services to address these deficits and reach maximum level of function.    Recent Surgery: * No surgery found *      Plan:     During this hospitalization, patient to be seen   to address the identified rehab impairments via gait training, therapeutic activities, therapeutic exercises and progress toward the following goals:    · Plan of Care Expires:  01/09/19    Subjective     Chief Complaint: PAIN  Patient/Family Comments/goals: INC MOBILITY /DEC PAIN  Pain/Comfort:  · Pain Rating 1: 3/10  · Location - Side 1: Bilateral  · Location 1: leg  · Pain Rating Post-Intervention 1: 3/10    Patients cultural, spiritual, Holiness conflicts given the current situation:      Living Environment:  PT LIVES AT HOME ALONE WITH 1 FLIGHT OF STEPS TO ENTER APT AND 1 FLIGHT OF STEPS TO 2ND STORY  Prior to admission, patients level of function was IND AND DRIVES. PT DOESN'T WORK.  Equipment used at home:  .  DME owned (not currently used): rolling walker.  Upon discharge, patient will have assistance from UNKNOWN.    Objective:     Communicated with NURSE AND Epic CHART REVIEW prior to session.  Patient found SUP IN BED telemetry, peripheral IV  upon PT entry to room.    General Precautions: Standard, fall   Orthopedic Precautions:N/A   Braces: N/A      Exams:  · RLE ROM: WFL  · RLE Strength: WFL  · LLE ROM: WFL  · LLE Strength: WFL    Functional Mobility:  PT MET IN RM SUP>SIT EOB WITH SBA. PT STOOD WITH RW AND CGA FOR GT X 60' WITH RW AND CGA. PT RETURNED TO RM T/F TO COUCH AND LEFT SEATED WITH ALL NEEDS MET PT EDUCATED ON ROLE OF P.T. AND LEFT WITH ALL NEEDS MET     AM-PAC 6 CLICK MOBILITY  Total Score:18     Patient left up in chair with call button in reach.    GOALS:   Multidisciplinary Problems     Physical Therapy Goals        Problem: Physical Therapy Goal    Goal Priority Disciplines Outcome Goal Variances Interventions   Physical Therapy Goal     PT, PT/OT                      History:     Past Medical History:   Diagnosis Date    Anxiety     Arthritis     Depression     HTN (hypertension)     Stroke 2008       Past Surgical History:   Procedure Laterality Date    left hip replacement         Clinical Decision Making:     History  Co-morbidities and personal factors that may impact the plan of care Examination  Body Structures and Functions, activity limitations and participation restrictions that may impact the plan of care Clinical Presentation   Decision Making/ Complexity Score   Co-morbidities:   [] Time since onset of injury / illness / exacerbation  [] Status of current condition  []Patient's cognitive status and safety concerns    [] Multiple Medical Problems (see med hx)  Personal Factors:   [] Patient's age  [] Prior Level of function   [] Patient's home situation (environment and family support)  [] Patient's level of motivation  [] Expected progression of patient      HISTORY:(criteria)    [] 57320 - no personal factors/history    [] 89358 - has 1-2 personal factor/comorbidity     [] 45834 - has >3 personal factor/comorbidity     Body Regions:  [] Objective examination findings  [] Head     []  Neck  [] Trunk   [] Upper Extremity  [] Lower Extremity    Body Systems:  [] For communication ability, affect, cognition, language, and  learning style: the assessment of the ability to make needs known, consciousness, orientation (person, place, and time), expected emotional /behavioral responses, and learning preferences (eg, learning barriers, education  needs)  [] For the neuromuscular system: a general assessment of gross coordinated movement (eg, balance, gait, locomotion, transfers, and transitions) and motor function  (motor control and motor learning)  [] For the musculoskeletal system: the assessment of gross symmetry, gross range of motion, gross strength, height, and weight  [] For the integumentary system: the assessment of pliability(texture), presence of scar formation, skin color, and skin integrity  [] For cardiovascular/pulmonary system: the assessment of heart rate, respiratory rate, blood pressure, and edema     Activity limitations:    [] Patient's cognitive status and saf ety concerns          [] Status of current condition      [] Weight bearing restriction  [] Cardiopulmunary Restriction    Participation Restrictions:   [] Goals and goal agreement with the patient     [] Rehab potential (prognosis) and probable outcome      Examination of Body System: (criteria)    [] 82163 - addressing 1-2 elements    [] 34990 - addressing a total of 3 or more elements     [] 07529 -  Addressing a total of 4 or more elements         Clinical Presentation: (criteria)  Choose one     On examination of body system using standardized tests and measures patient presents with (CHOOSE ONE) elements from any of the following: body structures and functions, activity limitations, and/or participation restrictions.  Leading to a clinical presentation that is considered (CHOOSE ONE)                              Clinical Decision Making  (Eval Complexity):  Choose One     Time Tracking:     PT Received On: 01/02/19  PT Start Time: 1100     PT Stop Time: 1125  PT Total Time (min): 25 min     Billable Minutes: Evaluation 15 and Gait Training  10      Violeta Gutierrez, PT  01/02/2019

## 2019-01-02 NOTE — ED PROVIDER NOTES
"SCRIBE #1 NOTE: I, Toshia Suresh, am scribing for, and in the presence of, Vitaliy Barrera MD. I have scribed the HPI, ROS, and PEx.     SCRIBE #2 NOTE: I, Simona Funes, am scribing for, and in the presence of,  Adelso Fields MD. I have scribed the remaining portions of the note not scribed by Scribe #1.        History     Chief Complaint   Patient presents with    Knee Pain       Review of patient's allergies indicates:   Allergen Reactions    Penicillins Other (See Comments) and Shortness Of Breath     Agitation  Occurred at age 15    Methocarbamol      Weird out , muscles,         History of Present Illness   HPI    1/2/2019, 3:37 AM  History obtained from the patient      History of Present Illness: Travis Abreu is a 58 y.o. male patient with PMHx of HTN and arthritis who presents to the Emergency Department for chronic arthralgias which worsened 3-4 days ago. Patient states he has been unable to get out of bed and get to his medications for the last 3-4 days. Symptoms are constant and moderate in severity. The patient describes the sxs as "having a flare up". Patient describes sxs as located to bilat wrist, hips, knees, and ankles. No mitigating or exacerbating factors reported. No other sxs reported. Patient denies any fever, chills, neck pain, back pain, CP, SOB, abd pain, extremity weakness/numbness, and all other sxs at this time. Patient reports home medications include Lortab, which he has not taken in 3-4 days. No further complaints or concerns at this time.     Arrival mode:  EMS     PCP: Primary Doctor No        Past Medical History:  Past Medical History:   Diagnosis Date    Anxiety     Arthritis     Depression     HTN (hypertension)     Stroke 2008       Past Surgical History:  Past Surgical History:   Procedure Laterality Date    left hip replacement           Family History:  Hx reviewed, not pertinent.    Social History:  Social History     Tobacco Use    Smoking status: Never " Smoker   Substance and Sexual Activity    Alcohol use: No     Frequency: Never    Drug use: No    Sexual activity: Unknown        Review of Systems   Review of Systems   Constitutional: Negative for chills and fever.   HENT: Negative for sore throat.    Respiratory: Negative for shortness of breath.    Cardiovascular: Negative for chest pain.   Gastrointestinal: Negative for abdominal pain and nausea.   Genitourinary: Negative for dysuria.   Musculoskeletal: Positive for arthralgias. Negative for back pain and neck pain.   Skin: Negative for rash.   Neurological: Negative for weakness and numbness.   Hematological: Does not bruise/bleed easily.   All other systems reviewed and are negative.       Physical Exam     Initial Vitals [01/02/19 0315]   BP Pulse Resp Temp SpO2   126/77 110 18 98.3 °F (36.8 °C) 99 %      MAP       --          Physical Exam  Nursing Notes and Vital Signs Reviewed.  Constitutional: Patient is in no acute distress. Well-developed and well-nourished.  Head: Atraumatic. Normocephalic.  Eyes: PERRL. EOM intact. Conjunctivae are not pale. No scleral icterus.  ENT: Mucous membranes are moist. Oropharynx is clear and symmetric.    Neck: Supple. Full ROM. No lymphadenopathy.  Cardiovascular: Regular rate. Regular rhythm. No murmurs, rubs, or gallops. Distal pulses are 2+ and symmetric.  Pulmonary/Chest: No respiratory distress. Clear to auscultation bilaterally. No wheezing or rales.  Abdominal: Soft and non-distended.  There is no tenderness.  No rebound, guarding, or rigidity.   Musculoskeletal: Moves all extremities. No obvious deformities. No edema.  RUE, LUE: has no evident deformity. Negative for swelling. Negative for tenderness. ROM is normal. Cap refill distally is <2 seconds. Radial pulses are equal and 2+ bilaterally. No motor deficit. No distal sensory deficit.  RLE, LLE: no evident deformity. Negative for swelling. Negative for tenderness. ROM is normal. Cap refill distally is <2  seconds. DP and PT pulses are equal and 2+ bilaterally. No motor deficit. No distal sensory deficit  Skin: Warm and dry.  Neurological:  Alert, awake, and appropriate.  Normal speech.  No acute focal neurological deficits are appreciated.  Psychiatric: Normal affect. Good eye contact. Appropriate in content.     ED Course   Procedures  ED Vital Signs:  Vitals:    01/02/19 0556 01/02/19 0602 01/02/19 0909 01/02/19 1100   BP: 118/75 121/77 (!) 143/83    Pulse: 103 98 93 (!) 114   Resp: 20 18 17    Temp: 98.1 °F (36.7 °C)  98.2 °F (36.8 °C)    TempSrc: Oral      SpO2: 98% 100% 100%    Weight:       Height:        01/02/19 1300 01/02/19 1500 01/02/19 1514 01/02/19 1700   BP:   131/80    Pulse: 104 90 88 105   Resp:   17    Temp:   97.6 °F (36.4 °C)    TempSrc:   Oral    SpO2:   96%    Weight:       Height:        01/02/19 2000 01/02/19 2339 01/03/19 0300 01/03/19 0713   BP: (!) 140/85 117/75 128/72 108/72   Pulse: 80 85  (!) 111   Resp: 18 18 18 16   Temp: 98.2 °F (36.8 °C) 98.1 °F (36.7 °C) 98.7 °F (37.1 °C) 96.3 °F (35.7 °C)   TempSrc: Oral Oral Oral Oral   SpO2: 99% 97% 98% 97%   Weight:       Height:        01/03/19 1149 01/03/19 1300 01/03/19 1614   BP: 96/65  131/72   Pulse: 103 (!) 113 103   Resp:      Temp: 97 °F (36.1 °C)  96.9 °F (36.1 °C)   TempSrc: Oral  Oral   SpO2: 98%  99%   Weight:      Height:          Abnormal Lab Results:  Labs Reviewed   CBC W/ AUTO DIFFERENTIAL - Abnormal; Notable for the following components:       Result Value    RBC 3.75 (*)     Hemoglobin 10.6 (*)     Hematocrit 31.6 (*)     All other components within normal limits   COMPREHENSIVE METABOLIC PANEL - Abnormal; Notable for the following components:    CO2 16 (*)     BUN, Bld 61 (*)     Creatinine 2.1 (*)     Calcium 12.6 (*)     Total Protein 9.4 (*)     Albumin 3.2 (*)     Alkaline Phosphatase 136 (*)     AST 50 (*)     Anion Gap 18 (*)     eGFR if  39 (*)     eGFR if non  34 (*)     All other  components within normal limits    Narrative:       CA critical result(s) called and verbal readback obtained from   Kamille Gomez RN, 01/02/2019 05:08   URINALYSIS, REFLEX TO URINE CULTURE - Abnormal; Notable for the following components:    Specific Gravity, UA >=1.030 (*)     Protein, UA 2+ (*)     Ketones, UA 1+ (*)     Bilirubin (UA) 1+ (*)     Occult Blood UA 1+ (*)     All other components within normal limits    Narrative:     Preferred Collection Type->Urine, Clean Catch   MAGNESIUM - Abnormal; Notable for the following components:    Magnesium 2.7 (*)     All other components within normal limits   PHOSPHORUS - Abnormal; Notable for the following components:    Phosphorus 5.1 (*)     All other components within normal limits   URINALYSIS MICROSCOPIC - Abnormal; Notable for the following components:    Granular Casts, UA 1 (*)     All other components within normal limits    Narrative:     Preferred Collection Type->Urine, Clean Catch   DRUG SCREEN PANEL, URINE EMERGENCY    Narrative:     Preferred Collection Type->Urine, Clean Catch   ALCOHOL,MEDICAL (ETHANOL)   CK   MAGNESIUM   PHOSPHORUS   CK        All Lab Results:  Results for orders placed or performed during the hospital encounter of 01/02/19   CBC auto differential   Result Value Ref Range    WBC 8.59 3.90 - 12.70 K/uL    RBC 3.75 (L) 4.60 - 6.20 M/uL    Hemoglobin 10.6 (L) 14.0 - 18.0 g/dL    Hematocrit 31.6 (L) 40.0 - 54.0 %    MCV 84 82 - 98 fL    MCH 28.3 27.0 - 31.0 pg    MCHC 33.5 32.0 - 36.0 g/dL    RDW 14.5 11.5 - 14.5 %    Platelets 341 150 - 350 K/uL    MPV 11.0 9.2 - 12.9 fL    Gran # (ANC) 6.1 1.8 - 7.7 K/uL    Lymph # 1.7 1.0 - 4.8 K/uL    Mono # 0.7 0.3 - 1.0 K/uL    Eos # 0.1 0.0 - 0.5 K/uL    Baso # 0.01 0.00 - 0.20 K/uL    Gran% 70.9 38.0 - 73.0 %    Lymph% 19.8 18.0 - 48.0 %    Mono% 7.7 4.0 - 15.0 %    Eosinophil% 1.5 0.0 - 8.0 %    Basophil% 0.1 0.0 - 1.9 %    Differential Method Automated    Comprehensive metabolic panel    Result Value Ref Range    Sodium 138 136 - 145 mmol/L    Potassium 4.8 3.5 - 5.1 mmol/L    Chloride 104 95 - 110 mmol/L    CO2 16 (L) 23 - 29 mmol/L    Glucose 103 70 - 110 mg/dL    BUN, Bld 61 (H) 6 - 20 mg/dL    Creatinine 2.1 (H) 0.5 - 1.4 mg/dL    Calcium 12.6 (HH) 8.7 - 10.5 mg/dL    Total Protein 9.4 (H) 6.0 - 8.4 g/dL    Albumin 3.2 (L) 3.5 - 5.2 g/dL    Total Bilirubin 0.4 0.1 - 1.0 mg/dL    Alkaline Phosphatase 136 (H) 55 - 135 U/L    AST 50 (H) 10 - 40 U/L    ALT 42 10 - 44 U/L    Anion Gap 18 (H) 8 - 16 mmol/L    eGFR if African American 39 (A) >60 mL/min/1.73 m^2    eGFR if non African American 34 (A) >60 mL/min/1.73 m^2   Drug screen panel, emergency   Result Value Ref Range    Benzodiazepines Negative     Methadone metabolites Negative     Cocaine (Metab.) Negative     Opiate Scrn, Ur Negative     Barbiturate Screen, Ur Negative     Amphetamine Screen, Ur Negative     THC Negative     Phencyclidine Negative     Creatinine, Random Ur 165.4 23.0 - 375.0 mg/dL    Toxicology Information SEE COMMENT    Urinalysis, Reflex to Urine Culture Urine, Clean Catch   Result Value Ref Range    Specimen UA Urine, Clean Catch     Color, UA Yellow Yellow, Straw, Shivani    Appearance, UA Clear Clear    pH, UA 6.0 5.0 - 8.0    Specific Gravity, UA >=1.030 (A) 1.005 - 1.030    Protein, UA 2+ (A) Negative    Glucose, UA Negative Negative    Ketones, UA 1+ (A) Negative    Bilirubin (UA) 1+ (A) Negative    Occult Blood UA 1+ (A) Negative    Nitrite, UA Negative Negative    Urobilinogen, UA Negative <2.0 EU/dL    Leukocytes, UA Negative Negative   Ethanol   Result Value Ref Range    Alcohol, Medical, Serum <10 <10 mg/dL   Magnesium   Result Value Ref Range    Magnesium 2.7 (H) 1.6 - 2.6 mg/dL   CK   Result Value Ref Range    CPK 23 20 - 200 U/L   Phosphorus   Result Value Ref Range    Phosphorus 5.1 (H) 2.7 - 4.5 mg/dL   Urinalysis Microscopic   Result Value Ref Range    RBC, UA 0 0 - 4 /hpf    WBC, UA 0 0 - 5 /hpf     Bacteria, UA Occasional None-Occ /hpf    Hyaline Casts, UA 0 0-1/lpf /lpf    Granular Casts, UA 1 (A) None /lpf    Amorphous, UA Moderate None-Moderate    Microscopic Comment SEE COMMENT    Basic metabolic panel   Result Value Ref Range    Sodium 135 (L) 136 - 145 mmol/L    Potassium 4.9 3.5 - 5.1 mmol/L    Chloride 105 95 - 110 mmol/L    CO2 19 (L) 23 - 29 mmol/L    Glucose 129 (H) 70 - 110 mg/dL    BUN, Bld 55 (H) 6 - 20 mg/dL    Creatinine 2.0 (H) 0.5 - 1.4 mg/dL    Calcium 10.7 (H) 8.7 - 10.5 mg/dL    Anion Gap 11 8 - 16 mmol/L    eGFR if African American 42 (A) >60 mL/min/1.73 m^2    eGFR if non African American 36 (A) >60 mL/min/1.73 m^2   CBC auto differential   Result Value Ref Range    WBC 3.84 (L) 3.90 - 12.70 K/uL    RBC 3.18 (L) 4.60 - 6.20 M/uL    Hemoglobin 8.8 (L) 14.0 - 18.0 g/dL    Hematocrit 27.8 (L) 40.0 - 54.0 %    MCV 87 82 - 98 fL    MCH 27.7 27.0 - 31.0 pg    MCHC 31.7 (L) 32.0 - 36.0 g/dL    RDW 13.8 11.5 - 14.5 %    Platelets 262 150 - 350 K/uL    MPV 11.4 9.2 - 12.9 fL    Gran # (ANC) 3.0 1.8 - 7.7 K/uL    Lymph # 0.6 (L) 1.0 - 4.8 K/uL    Mono # 0.2 (L) 0.3 - 1.0 K/uL    Eos # 0.1 0.0 - 0.5 K/uL    Baso # 0.02 0.00 - 0.20 K/uL    Gran% 78.4 (H) 38.0 - 73.0 %    Lymph% 15.1 (L) 18.0 - 48.0 %    Mono% 4.2 4.0 - 15.0 %    Eosinophil% 1.8 0.0 - 8.0 %    Basophil% 0.5 0.0 - 1.9 %    Differential Method Automated    Comprehensive metabolic panel   Result Value Ref Range    Sodium 135 (L) 136 - 145 mmol/L    Potassium 4.5 3.5 - 5.1 mmol/L    Chloride 105 95 - 110 mmol/L    CO2 19 (L) 23 - 29 mmol/L    Glucose 109 70 - 110 mg/dL    BUN, Bld 46 (H) 6 - 20 mg/dL    Creatinine 1.5 (H) 0.5 - 1.4 mg/dL    Calcium 10.9 (H) 8.7 - 10.5 mg/dL    Total Protein 7.4 6.0 - 8.4 g/dL    Albumin 2.6 (L) 3.5 - 5.2 g/dL    Total Bilirubin 0.3 0.1 - 1.0 mg/dL    Alkaline Phosphatase 111 55 - 135 U/L    AST 32 10 - 40 U/L    ALT 32 10 - 44 U/L    Anion Gap 11 8 - 16 mmol/L    eGFR if African American 59 (A) >60  mL/min/1.73 m^2    eGFR if non African American 51 (A) >60 mL/min/1.73 m^2   Comprehensive metabolic panel   Result Value Ref Range    Sodium 135 (L) 136 - 145 mmol/L    Potassium 4.5 3.5 - 5.1 mmol/L    Chloride 105 95 - 110 mmol/L    CO2 19 (L) 23 - 29 mmol/L    Glucose 109 70 - 110 mg/dL    BUN, Bld 46 (H) 6 - 20 mg/dL    Creatinine 1.5 (H) 0.5 - 1.4 mg/dL    Calcium 10.9 (H) 8.7 - 10.5 mg/dL    Total Protein 7.4 6.0 - 8.4 g/dL    Albumin 2.6 (L) 3.5 - 5.2 g/dL    Total Bilirubin 0.3 0.1 - 1.0 mg/dL    Alkaline Phosphatase 111 55 - 135 U/L    AST 32 10 - 40 U/L    ALT 32 10 - 44 U/L    Anion Gap 11 8 - 16 mmol/L    eGFR if African American 59 (A) >60 mL/min/1.73 m^2    eGFR if non African American 51 (A) >60 mL/min/1.73 m^2   CBC auto differential   Result Value Ref Range    WBC 3.84 (L) 3.90 - 12.70 K/uL    RBC 3.18 (L) 4.60 - 6.20 M/uL    Hemoglobin 8.8 (L) 14.0 - 18.0 g/dL    Hematocrit 27.8 (L) 40.0 - 54.0 %    MCV 87 82 - 98 fL    MCH 27.7 27.0 - 31.0 pg    MCHC 31.7 (L) 32.0 - 36.0 g/dL    RDW 13.8 11.5 - 14.5 %    Platelets 262 150 - 350 K/uL    MPV 11.4 9.2 - 12.9 fL    Gran # (ANC) 3.0 1.8 - 7.7 K/uL    Lymph # 0.6 (L) 1.0 - 4.8 K/uL    Mono # 0.2 (L) 0.3 - 1.0 K/uL    Eos # 0.1 0.0 - 0.5 K/uL    Baso # 0.02 0.00 - 0.20 K/uL    Gran% 78.4 (H) 38.0 - 73.0 %    Lymph% 15.1 (L) 18.0 - 48.0 %    Mono% 4.2 4.0 - 15.0 %    Eosinophil% 1.8 0.0 - 8.0 %    Basophil% 0.5 0.0 - 1.9 %    Differential Method Automated        Imaging Results:  Imaging Results    None                    The Emergency Provider reviewed the vital signs and test results, which are outlined above.     ED Discussion     5:36 AM: Dr. Barrera discussed the pt's case with Dr. Davidson ([facility]) who recommends give additional liter of fluids and if UA is negative, then discharge patient. Dr. Davidson states she will come evaluate patient.    6:02 AM: Dr. Barrera transfers care of pt to Dr. Fields, pending lab results.    7:59 AM: Discussed case  with HUMBLE Mayers ([facility]) who will review pt's chart and wait for  to evaluate pt.    8:03 AM  Every discussed case with [facility] medicine.   has apparently seen the patient.  She believes this is a social problem not a medical problem.  [facility] is pending  evaluation.  I discussed with them my concerns over his inability to care for himself in his profound dehydration.  They are aware.    8:06 AM: Discussed case with HUMBLE Mayers ([facility]). Dr. Herrera agrees with current care and management of pt and accepts admission.   Admitting Service: [facility] medicine   Admitting Physician: Dr. Herrera  Admit to: Obs-Tele    8:10 AM: Re-evaluated pt. I have discussed test results, shared treatment plan, and the need for admission with patient. Pt expresses understanding at this time and agree with all information. All questions answered. Pt has no further questions or concerns at this time. Pt is ready for admit.    8:10 AM  Patient is stable nontoxic.  He is profoundly dehydrated unable to get up and move around due to chronic pain from his exacerbation of his chronic arthritis.  He does have a family history of ankylosing spondylitis and question if this is some sort of underlying rheumatic disorder.  The patient is unable to get up and care for himself, is being admitted to treat his dehydration as he will likely decompensated for at least.      ED Medication(s):  Medications   sodium chloride 0.9% bolus 1,000 mL (0 mLs Intravenous Stopped 1/2/19 1506)   naproxen tablet 500 mg (500 mg Oral Given 1/2/19 5121)   sodium chloride 0.9% bolus 1,000 mL (1,000 mLs Intravenous New Bag 1/2/19 1825)   HYDROcodone-acetaminophen  mg per tablet 1 tablet (1 tablet Oral Given 1/2/19 9420)     Follow-up Information     Shaq Ramos DO. Schedule an appointment as soon as possible for a visit in 4 days.    Specialty:  Family Medicine  Why:  hospital  follow up   Contact information:  [address] [facility]  [address]  [place] [address]  [phone]             Logan Borrego MD. Schedule an appointment as soon as possible for a visit in 1 week.    Specialty:  Rheumatology  Why:  hospital follow up   Contact information:  [address]  [place] [address]  [phone]             [facility].    Specialty:  Home Health Services  Why:  Home Health will contact patient upon d/c to schedule services.  Contact information:  [address] [address]  [phone]                        Medical Decision Making     Medical Decision Making:   Clinical Tests:   Lab Tests: Ordered and Reviewed             Scribe Attestation:   Scribe #1: I performed the above scribed service and the documentation accurately describes the services I performed. I attest to the accuracy of the note.     Attending:   Physician Attestation Statement for Scribe #1: I, Vitaliy Barrera MD, personally performed the services described in this documentation, as scribed by Toshia Suresh, in my presence, and it is both accurate and complete.       Scribe Attestation:   Scribe #2: I performed the above scribed service and the documentation accurately describes the services I performed. I attest to the accuracy of the note.    Attending Attestation:           Physician Attestation for Scribe:    Physician Attestation Statement for Scribe #2: I, Adelso Fields MD, reviewed documentation, as scribed by Simona Fuens in my presence, and it is both accurate and complete. I also acknowledge and confirm the content of the note done by Scribe #1.           Clinical Impression       ICD-10-CM ICD-9-CM   1. EMY (acute kidney injury) N17.9 584.9   2. Dehydration E86.0 276.51   3. Acute renal failure, unspecified acute renal failure type N17.9 584.9   4. Chronic arthritis M19.90 716.90   5. Hypercalcemia E83.52 275.42       Disposition:   Disposition: Placed in  Observation  Condition: Fair         Adelso Fields Jr., MD  01/02/19 0811       Adelso Fields Jr., MD  01/11/19 1001

## 2019-01-02 NOTE — PLAN OF CARE
"CM met with patient at bedside to assess discharge needs. Patient states he lives in an apartment that is an "independent living" building. Patient described living situation as, "we pay rent to Ms. Mistry". He is independent with needs. Patient ambulates with a cane and denies any recent falls. No dc needs identified at this time. CM assessment and MOON completed, placed in chart, and copy given to patient. Updated patient white board with current d/c plan and CM contact information. Encouraged patient to contact CM if any questions or concerns arise.    DC Plan: Home/selfcare  Patient agrees with plan.     Transportation home at d/c: friend    Contact: Dur (unsure of last name), friend  Phone: [phone]       01/02/19 1319   Discharge Assessment   Assessment Type Discharge Planning Assessment   Confirmed/corrected address and phone number on facesheet? Yes   Assessment information obtained from? Patient   Communicated expected length of stay with patient/caregiver yes   Prior to hospitilization cognitive status: Alert/Oriented   Prior to hospitalization functional status: Independent   Current cognitive status: Alert/Oriented   Current Functional Status: Independent   Lives With other (see comments)  ("Independent living facilty"/group home)   Able to Return to Prior Arrangements yes   Is patient able to care for self after discharge? Yes   Patient's perception of discharge disposition home or selfcare   Readmission Within the Last 30 Days no previous admission in last 30 days   Patient currently being followed by outpatient case management? No   Patient currently receives any other outside agency services? No   Equipment Currently Used at Home cane, straight   Do you have any problems affording any of your prescribed medications? No   Is the patient taking medications as prescribed? yes   Does the patient have transportation home? Yes   Transportation Anticipated family or friend will provide  (friend)   Does " the patient receive services at the [facility]? No   Discharge Plan A Home   Patient/Family in Agreement with Plan yes

## 2019-01-02 NOTE — ASSESSMENT & PLAN NOTE
Calcium level 12.6, however Ca level was 11.1 on 6/13/2018.   May be chronic   Denies any recent intake of Calcium supplement or Tums   IVF   Monitor level closely

## 2019-01-02 NOTE — PROGRESS NOTES
"Saw patient.   Patient's exam limited because patient refuses to perform exercises due to reported pain.  Passive palpation of joints did not exacerbate pain, but when encouraged patient to sit up to move, he grimaced and complained of "severe pain" that is debilitating.  ROM of all extremities appear normal with independent movement.  No swelling or redness seen of bilateral knee, ankle, as well as bilateral elbow and wrist joints.  After physical exam, we discussed with patient that labs indicate he is dehydrated and recommend to ER to infuse additional 1L IVF.  We are also pending results of some labs before making recommendation on final plan of care.  We also relayed to the patient that his medical records and family history do indicate connective tissue disease that needs further evaluation/follow up with rheumatology.   Will pass on to day team to reassess and reevaluate for admission.   "

## 2019-01-03 VITALS
TEMPERATURE: 97 F | BODY MASS INDEX: 21.92 KG/M2 | HEIGHT: 70 IN | RESPIRATION RATE: 16 BRPM | HEART RATE: 103 BPM | WEIGHT: 153.13 LBS | DIASTOLIC BLOOD PRESSURE: 72 MMHG | SYSTOLIC BLOOD PRESSURE: 131 MMHG | OXYGEN SATURATION: 99 %

## 2019-01-03 LAB
ALBUMIN SERPL BCP-MCNC: 2.6 G/DL
ALBUMIN SERPL BCP-MCNC: 2.6 G/DL
ALP SERPL-CCNC: 111 U/L
ALP SERPL-CCNC: 111 U/L
ALT SERPL W/O P-5'-P-CCNC: 32 U/L
ALT SERPL W/O P-5'-P-CCNC: 32 U/L
ANION GAP SERPL CALC-SCNC: 11 MMOL/L
ANION GAP SERPL CALC-SCNC: 11 MMOL/L
AST SERPL-CCNC: 32 U/L
AST SERPL-CCNC: 32 U/L
BASOPHILS # BLD AUTO: 0.02 K/UL
BASOPHILS # BLD AUTO: 0.02 K/UL
BASOPHILS NFR BLD: 0.5 %
BASOPHILS NFR BLD: 0.5 %
BILIRUB SERPL-MCNC: 0.3 MG/DL
BILIRUB SERPL-MCNC: 0.3 MG/DL
BUN SERPL-MCNC: 46 MG/DL
BUN SERPL-MCNC: 46 MG/DL
CALCIUM SERPL-MCNC: 10.9 MG/DL
CALCIUM SERPL-MCNC: 10.9 MG/DL
CHLORIDE SERPL-SCNC: 105 MMOL/L
CHLORIDE SERPL-SCNC: 105 MMOL/L
CO2 SERPL-SCNC: 19 MMOL/L
CO2 SERPL-SCNC: 19 MMOL/L
CREAT SERPL-MCNC: 1.5 MG/DL
CREAT SERPL-MCNC: 1.5 MG/DL
DIFFERENTIAL METHOD: ABNORMAL
DIFFERENTIAL METHOD: ABNORMAL
EOSINOPHIL # BLD AUTO: 0.1 K/UL
EOSINOPHIL # BLD AUTO: 0.1 K/UL
EOSINOPHIL NFR BLD: 1.8 %
EOSINOPHIL NFR BLD: 1.8 %
ERYTHROCYTE [DISTWIDTH] IN BLOOD BY AUTOMATED COUNT: 13.8 %
ERYTHROCYTE [DISTWIDTH] IN BLOOD BY AUTOMATED COUNT: 13.8 %
EST. GFR  (AFRICAN AMERICAN): 59 ML/MIN/1.73 M^2
EST. GFR  (AFRICAN AMERICAN): 59 ML/MIN/1.73 M^2
EST. GFR  (NON AFRICAN AMERICAN): 51 ML/MIN/1.73 M^2
EST. GFR  (NON AFRICAN AMERICAN): 51 ML/MIN/1.73 M^2
GLUCOSE SERPL-MCNC: 109 MG/DL
GLUCOSE SERPL-MCNC: 109 MG/DL
HCT VFR BLD AUTO: 27.8 %
HCT VFR BLD AUTO: 27.8 %
HGB BLD-MCNC: 8.8 G/DL
HGB BLD-MCNC: 8.8 G/DL
LYMPHOCYTES # BLD AUTO: 0.6 K/UL
LYMPHOCYTES # BLD AUTO: 0.6 K/UL
LYMPHOCYTES NFR BLD: 15.1 %
LYMPHOCYTES NFR BLD: 15.1 %
MCH RBC QN AUTO: 27.7 PG
MCH RBC QN AUTO: 27.7 PG
MCHC RBC AUTO-ENTMCNC: 31.7 G/DL
MCHC RBC AUTO-ENTMCNC: 31.7 G/DL
MCV RBC AUTO: 87 FL
MCV RBC AUTO: 87 FL
MONOCYTES # BLD AUTO: 0.2 K/UL
MONOCYTES # BLD AUTO: 0.2 K/UL
MONOCYTES NFR BLD: 4.2 %
MONOCYTES NFR BLD: 4.2 %
NEUTROPHILS # BLD AUTO: 3 K/UL
NEUTROPHILS # BLD AUTO: 3 K/UL
NEUTROPHILS NFR BLD: 78.4 %
NEUTROPHILS NFR BLD: 78.4 %
PLATELET # BLD AUTO: 262 K/UL
PLATELET # BLD AUTO: 262 K/UL
PMV BLD AUTO: 11.4 FL
PMV BLD AUTO: 11.4 FL
POTASSIUM SERPL-SCNC: 4.5 MMOL/L
POTASSIUM SERPL-SCNC: 4.5 MMOL/L
PROT SERPL-MCNC: 7.4 G/DL
PROT SERPL-MCNC: 7.4 G/DL
RBC # BLD AUTO: 3.18 M/UL
RBC # BLD AUTO: 3.18 M/UL
SODIUM SERPL-SCNC: 135 MMOL/L
SODIUM SERPL-SCNC: 135 MMOL/L
WBC # BLD AUTO: 3.84 K/UL
WBC # BLD AUTO: 3.84 K/UL

## 2019-01-03 PROCEDURE — 97535 SELF CARE MNGMENT TRAINING: CPT | Performed by: PHYSICAL THERAPIST

## 2019-01-03 PROCEDURE — 80053 COMPREHEN METABOLIC PANEL: CPT

## 2019-01-03 PROCEDURE — 97530 THERAPEUTIC ACTIVITIES: CPT

## 2019-01-03 PROCEDURE — 97530 THERAPEUTIC ACTIVITIES: CPT | Performed by: PHYSICAL THERAPIST

## 2019-01-03 PROCEDURE — 63600175 PHARM REV CODE 636 W HCPCS: Performed by: EMERGENCY MEDICINE

## 2019-01-03 PROCEDURE — 96374 THER/PROPH/DIAG INJ IV PUSH: CPT

## 2019-01-03 PROCEDURE — 25000003 PHARM REV CODE 250: Performed by: EMERGENCY MEDICINE

## 2019-01-03 PROCEDURE — 85025 COMPLETE CBC W/AUTO DIFF WBC: CPT

## 2019-01-03 PROCEDURE — 36415 COLL VENOUS BLD VENIPUNCTURE: CPT

## 2019-01-03 PROCEDURE — 96375 TX/PRO/DX INJ NEW DRUG ADDON: CPT

## 2019-01-03 PROCEDURE — G0378 HOSPITAL OBSERVATION PER HR: HCPCS

## 2019-01-03 PROCEDURE — 97116 GAIT TRAINING THERAPY: CPT | Mod: 59

## 2019-01-03 PROCEDURE — 25000003 PHARM REV CODE 250: Performed by: NURSE PRACTITIONER

## 2019-01-03 RX ADMIN — HYDROCODONE BITARTRATE AND ACETAMINOPHEN 1 TABLET: 10; 325 TABLET ORAL at 08:01

## 2019-01-03 RX ADMIN — OXYCODONE HYDROCHLORIDE AND ACETAMINOPHEN 1000 MG: 500 TABLET ORAL at 08:01

## 2019-01-03 RX ADMIN — SODIUM CHLORIDE: 0.9 INJECTION, SOLUTION INTRAVENOUS at 06:01

## 2019-01-03 RX ADMIN — ONDANSETRON 4 MG: 2 INJECTION, SOLUTION INTRAMUSCULAR; INTRAVENOUS at 03:01

## 2019-01-03 RX ADMIN — STANDARDIZED SENNA CONCENTRATE AND DOCUSATE SODIUM 1 TABLET: 8.6; 5 TABLET, FILM COATED ORAL at 08:01

## 2019-01-03 RX ADMIN — HYDROCODONE BITARTRATE AND ACETAMINOPHEN 1 TABLET: 5; 325 TABLET ORAL at 02:01

## 2019-01-03 RX ADMIN — SODIUM CHLORIDE: 0.9 INJECTION, SOLUTION INTRAVENOUS at 12:01

## 2019-01-03 RX ADMIN — DULOXETINE 60 MG: 30 CAPSULE, DELAYED RELEASE ORAL at 08:01

## 2019-01-03 RX ADMIN — FAMOTIDINE 20 MG: 20 TABLET ORAL at 08:01

## 2019-01-03 NOTE — PT/OT/SLP PROGRESS
Physical Therapy  Treatment    Travis Abreu   MRN: [id number]   Admitting Diagnosis: EMY (acute kidney injury)    PT Received On: 01/03/19  PT Start Time: 0910     PT Stop Time: 0935    PT Total Time (min): 25 min       Billable Minutes:  Gait Training 15 and Therapeutic Activity 10    Treatment Type: Treatment  PT/PTA: PT             General Precautions: Standard, fall  Orthopedic Precautions: N/A   Braces: N/A         Subjective:  Communicated with NURSE STEIN AND Epic CHART REVIEW prior to session.   PT MET IN  AGREED TO TX     Pain/Comfort  Pain Rating 1: 3/10  Location - Side 1: Bilateral  Location 1: leg  Pain Rating Post-Intervention 1: 3/10    Objective:   Patient found with: telemetry, peripheral IV    Functional Mobility:  PT MET IN  SUP>SIT EOB WITH SBA. PT STOOD FOR CLEANING AS PT HAD SOILED HIMSELF. PT STOOD WITH 1 UE SUPPORT FOR CLEANING. PT THEN GT TRAINED X 3' TO SINK FOR HAND HYGIENE. PT GT TRAINED X 250' WITH RW AND SBA. PT RETURNED TO  T/F TO COUCH AND LEFT SEATED WITH CALL BELL IN REACH AND ALL NEEDS MET.     AM-PAC 6 CLICK MOBILITY  How much help from another person does this patient currently need?   1 = Unable, Total/Dependent Assistance  2 = A lot, Maximum/Moderate Assistance  3 = A little, Minimum/Contact Guard/Supervision  4 = None, Modified [place]/Independent    Turning over in bed (including adjusting bedclothes, sheets and blankets)?: 4  Sitting down on and standing up from a chair with arms (e.g., wheelchair, bedside commode, etc.): 4  Moving from lying on back to sitting on the side of the bed?: 4  Moving to and from a bed to a chair (including a wheelchair)?: 4  Climbing 3-5 steps with a railing?: 1    AM-PAC Raw Score CMS G-Code Modifier Level of Impairment Assistance   6 % Total / Unable   7 - 9 CM 80 - 100% Maximal Assist   10 - 14 CL 60 - 80% Moderate Assist   15 - 19 CK 40 - 60% Moderate Assist   20 - 22 CJ 20 - 40% Minimal Assist   23 CI 1-20% SBA / CGA   24  CH 0% Independent/ Mod I     Patient left up in chair with call button in reach.    Assessment:  PT PROGRESSING WITH GT.     Rehab identified problem list/impairments: Rehab identified problem list/impairments: weakness, impaired balance, impaired endurance, decreased safety awareness, impaired functional mobilty, impaired self care skills, decreased ROM    Rehab potential is good.    Activity tolerance: Good    Discharge recommendations: Discharge Facility/Level of Care Needs: other (see comments)(HH P.T. )     Barriers to discharge:      Equipment recommendations: Equipment Needed After Discharge: walker, rolling     GOALS:   Multidisciplinary Problems     Physical Therapy Goals        Problem: Physical Therapy Goal    Goal Priority Disciplines Outcome Goal Variances Interventions   Physical Therapy Goal     PT, PT/OT                      PLAN:    Patient to be seen    to address the above listed problems via gait training, therapeutic activities, therapeutic exercises  Plan of Care expires: 01/09/19  Plan of Care reviewed with: patient         Violeta Gutierrez, PT  01/03/2019

## 2019-01-03 NOTE — PLAN OF CARE
[facility] ([facility]) accepting agency. Charted on AVS.       01/03/19 1106   Post-Acute Status   Post-Acute Authorization Home Health/Hospice   Home Health/Hospice Status Referrals Sent

## 2019-01-03 NOTE — DISCHARGE SUMMARY
[facility]  Hospital Medicine  Discharge Summary      Patient Name: Travis Abreu  MRN: [id number]  Admission Date: 1/2/2019  Hospital Length of Stay: 0 days  Discharge Date and Time:  01/03/2019 12:32 PM  Attending Physician: Elyssa Evans MD   Discharging Provider: Georges Forman NP  Primary Care Provider: Primary Doctor No      HPI:   Mr Abreu is a 58 year old male with PMHx of arthritis, stroke, Lt hip replacement and HTN who presented to [facility] Emergency Room with increase arthritic pain that was worsen over the pass few days to a week. He reports unable to get out of bed due to increase pain and was unable to get food or water. Pain is primary to the Rt knee and lower extremities. Denies any recent injury or trama. Denies associated symptoms of chest pain, shortness of breath, syncope, dizziness, nausea, fever, chills, nausea or vomiting. Creatinine 2.1 today, however Care Everywhere shows creatinine of 1.12 on  7/12/2018. Calcium level 12.1 today, level was 11.1 on 6/2018. Patient reports NSAID has cause him GI bleeding in the pain. He reports has seen chronic pain physician in the pass.         * No surgery found *      Hospital Course:   Mr Abreu is a 58 year old male who was admitted to [facility] with EMY after experiencing increase pain and was unable to get of bed. Patient also experience elevated Ca level that appears to be chronic. He received IVF bolus in the Emergency Room and was started on continue IVF's. Today, Creatinine treaded down to 1.5 and Ca level down to 10.9.  PT/OT has also evaluated and treated, they recommend home health PT/OT.  evaluated following. He reports feeling well. Patient will follow up with PCP and rheumatology as outpatient. He was seen, examined and deemed suitable for discharge.      Consults:   Consults (From admission, onward)        Status Ordering Provider     Inpatient consult to Social Work  Once     Provider:  (Not yet  assigned)    Completed YAMILE CROWE          No new Assessment & Plan notes have been filed under this hospital service since the last note was generated.  Service: Hospital Medicine    Final Active Diagnoses:    Diagnosis Date Noted POA    PRINCIPAL PROBLEM:  EMY (acute kidney injury) [N17.9] 01/02/2019 Yes    Dehydration [E86.0] 01/02/2019 Yes    Hypercalcemia [E83.52] 01/02/2019 Unknown    Arthralgia [M25.50] 01/02/2019 Unknown      Problems Resolved During this Admission:       Discharged Condition: stable    Disposition: Home or Self Care    Follow Up:  Follow-up Information     Shaq Ramos DO. Schedule an appointment as soon as possible for a visit in 4 days.    Specialty:  Family Medicine  Why:  hospital follow up   Contact information:  [address]  [address]  [facility] [address] [phone]             Logan Borrego MD. Schedule an appointment as soon as possible for a visit in 1 week.    Specialty:  Rheumatology  Why:  hospital follow up   Contact information:  [address]  [place] [address] [phone]             [facility].    Specialty:  Home Health Services  Why:  Home Health will contact patient upon d/c to schedule services.  Contact information:  [address] [address] [phone]                 Patient Instructions:      Notify your health care provider if you experience any of the following:  severe uncontrolled pain     Activity as tolerated       Significant Diagnostic Studies: Labs:   CMP   Recent Labs   Lab 01/02/19  0407 01/02/19  1600 01/03/19  0402    135* 135*  135*   K 4.8 4.9 4.5  4.5    105 105  105   CO2 16* 19* 19*  19*    129* 109  109   BUN 61* 55* 46*  46*   CREATININE 2.1* 2.0* 1.5*  1.5*   CALCIUM 12.6* 10.7* 10.9*  10.9*   PROT 9.4*  --  7.4  7.4   ALBUMIN 3.2*  --  2.6*  2.6*   BILITOT 0.4  --  0.3  0.3   ALKPHOS 136*  --  111  111   AST 50*  --  32  32   ALT 42   --  32  32   ANIONGAP 18* 11 11  11   ESTGFRAFRICA 39* 42* 59*  59*   EGFRNONAA 34* 36* 51*  51*    and CBC   Recent Labs   Lab 01/02/19  0407 01/03/19  0402   WBC 8.59 3.84*  3.84*   HGB 10.6* 8.8*  8.8*   HCT 31.6* 27.8*  27.8*    262  262       Pending Diagnostic Studies:     None         Medications:  Reconciled Home Medications:      Medication List      CONTINUE taking these medications    amLODIPine 5 MG tablet  Commonly known as:  NORVASC  TAKE ONE TABLET BY MOUTH ONE TIME DAILY     ascorbic acid (vitamin C) 1000 MG tablet  Commonly known as:  VITAMIN C  Take 1,000 mg by mouth.     DULoxetine 60 MG capsule  Commonly known as:  CYMBALTA  TAKE ONE CAPSULE BY MOUTH ONE TIME DAILY     eszopiclone 1 MG Tab  Commonly known as:  LUNESTA  Take 3 mg by mouth nightly as needed.     HYDROcodone-acetaminophen  mg per tablet  Commonly known as:  NORCO  Take 1 tablet by mouth every 8 (eight) hours as needed.     hydrOXYzine 50 MG tablet  Commonly known as:  ATARAX  TAKE ONE TABLET BY MOUTH AT BEDTIME for itching     megestrol 40 MG Tab  Commonly known as:  MEGACE  Take 40 mg by mouth 2 (two) times daily .     QUEtiapine 50 MG tablet  Commonly known as:  SEROQUEL  TAKE 1 TABLET BY MOUTH NIGHTLY     ZANAFLEX 4 mg Cap  Generic drug:  tiZANidine  Take 4 mg by mouth 2 (two) times daily as needed.            Indwelling Lines/Drains at time of discharge:   Lines/Drains/Airways          None          Time spent on the discharge of patient: 40 minutes  Patient was seen and examined on the date of discharge and determined to be suitable for discharge.         Georges Forman NP  Department of Hospital Medicine  [facility]

## 2019-01-03 NOTE — PLAN OF CARE
Problem: Adult Inpatient Plan of Care  Goal: Patient-Specific Goal (Individualization)  Outcome: Ongoing (interventions implemented as appropriate)  Pt is AAO x 4. VSS. Pt remained free of injury this shift. Bed in low position w/ wheels locked.

## 2019-01-03 NOTE — PLAN OF CARE
CM received verbal consult from Georges Forman NP informing CM that Home Health was ordered for patients d/c as recommended by PT.   CM met with patient to discuss recommendations. Patient agreed with Home Health recommendations. Provided patient with laminated list of Home Health agencies. Patient [facility] ([facility]) and signed patient preference form. Placed signed form in patient's chart. CM sent referral via [facility]/[employer].  [facility] has accepted patient. CM noted on patients AVS.

## 2019-01-03 NOTE — PLAN OF CARE
Problem: Occupational Therapy Goal  Goal: Occupational Therapy Goal  Pt to be seen a minimum of 3 out of 7 days a week for skilled OT  LTGs to be Met by 1/9/19  1. Pt will perform bed mobility with Mod (I)   2. Pt will perform commode t/fs with Mod (I)  3. Pt will perform UE and LE dressing with Supervision  4. Pt will tolerate (B) UE ROM exercises 1 x 20 reps   Outcome: Ongoing (interventions implemented as appropriate)  Pt performed G/H tasks in standing with SBA

## 2019-01-03 NOTE — PLAN OF CARE
Problem: Physical Therapy Goal  Goal: Physical Therapy Goal  PT GT TRAINED X 250' WITH RW AND SBA

## 2019-01-03 NOTE — HOSPITAL COURSE
Mr Abreu is a 58 year old male who was admitted to [facility] with EMY after experiencing increase pain and was unable to get of bed. Patient also experience elevated Ca level that appears to be chronic. He received IVF bolus in the Emergency Room and was started on continue IVF's. Today, Creatinine treaded down to 1.5 and Ca level down to 10.9.  PT/OT has also evaluated and treated, they recommend home health PT/OT.  evaluated following. He reports feeling well. Patient will follow up with PCP and rheumatology as outpatient. He was seen, examined and deemed suitable for discharge.

## 2019-01-03 NOTE — PROGRESS NOTES
Pharmacist Renal Dose Adjustment Note    Travis Abreu is a 58 y.o. male being treated with the medication famotidine (Pepcid) for stress ulcer prophylaxis.    Patient Data:    Vital Signs (Most Recent):  Temp: 97.6 °F (36.4 °C) (01/02/19 1514)  Pulse: 105 (01/02/19 1700)  Resp: 17 (01/02/19 1514)  BP: 131/80 (01/02/19 1514)  SpO2: 96 % (01/02/19 1514) Vital Signs (72h Range):  Temp:  [97.6 °F (36.4 °C)-98.3 °F (36.8 °C)]   Pulse:  []   Resp:  [17-20]   BP: (111-143)/(75-83)   SpO2:  [96 %-100 %]      Recent Labs   Lab 01/02/19  0407 01/02/19  1600   CREATININE 2.1* 2.0*     Serum creatinine: 2 mg/dL (H) 01/02/19 1600  Estimated creatinine clearance: 40 mL/min (A)    Pepcid 20 mg PO BID will be decreased to 20 mg PO once daily due to CrCl < 50 ml/min.    Pharmacist's Name: Katherine E Mcardle  Pharmacist's Extension: [phone]

## 2019-01-03 NOTE — PT/OT/SLP PROGRESS
"Occupational Therapy  Treatment    Travis Abreu   MRN: [id number]   Admitting Diagnosis: EMY (acute kidney injury)    OT Date of Treatment: 01/03/19   OT Start Time: 0850  OT Stop Time: 0915  OT Total Time (min): 25 min    Billable Minutes:  Self Care/Home Management 10 min and Therapeutic Activity 15 min    General Precautions: Standard, fall  Orthopedic Precautions: N/A  Braces: N/A         Subjective:  Communicated with Nurse Knox and Epic chart review prior to session.  Pt found supine in bed and agreeable to tx at this time.  Pain/Comfort  Pain Rating 1: 3/10  Location - Side 1: Bilateral  Location 1: leg  Pain Addressed 1: Reposition, Distraction  Pain Rating Post-Intervention 1: 3/10    Objective:  Patient found with: peripheral IV, telemetry       Pt performed supine>sit with Supervision, scooted to EOB with Supervision, sit>stand using RW with CGA. Pt performed hygiene tasks in standing using RW with CGA in order to clean his bottom. Pt then donned hospital under in sitting then finished in standing with CGA. Pt stood at sink to wash his hands with Supervision. Pt performed functional mobility using RW ~250ft with CGA, t/f to sofa using RW with CGA.    AM-PAC 6 CLICK ADL   How much help from another person does this patient currently need?   1 = Unable, Total/Dependent Assistance  2 = A lot, Maximum/Moderate Assistance  3 = A little, Minimum/Contact Guard/Supervision  4 = None, Modified [place]/Independent    Putting on and taking off regular lower body clothing? : 3  Bathing (including washing, rinsing, drying)?: 3  Toileting, which includes using toilet, bedpan, or urinal? : 3  Putting on and taking off regular upper body clothing?: 3  Taking care of personal grooming such as brushing teeth?: 3  Eating meals?: 4  Daily Activity Total Score: 19     AM-PAC Raw Score CMS "G-Code Modifier Level of Impairment Assistance   6 % Total / Unable   7 - 8 CM 80 - 100% Maximal Assist   9-13 CL 60 - 80% " Moderate Assist   14 - 19 CK 40 - 60% Moderate Assist   20 - 22 CJ 20 - 40% Minimal Assist   23 CI 1-20% SBA / CGA   24 CH 0% Independent/ Mod I       Patient left sitting on sofa with all lines intact, call button in reach and Nurse Abilio notified    ASSESSMENT:  Travis Abreu is a 58 y.o. male with a medical diagnosis of EMY (acute kidney injury) and presents with impaired functional mobility and ADLs. Pt will benefit from continued skilled OT in order to address impairments.    Rehab identified problem list/impairments: Rehab identified problem list/impairments: weakness, impaired endurance, impaired self care skills, impaired functional mobilty, decreased coordination, impaired balance, gait instability, decreased upper extremity function, decreased lower extremity function, decreased safety awareness, pain, decreased ROM    Rehab potential is good.    Activity tolerance: Good    Discharge recommendations: Discharge Facility/Level of Care Needs: other (see comments)(Home Health)     Barriers to discharge:      Equipment recommendations: none     GOALS:   Multidisciplinary Problems     Occupational Therapy Goals        Problem: Occupational Therapy Goal    Goal Priority Disciplines Outcome Interventions   Occupational Therapy Goal     OT, PT/OT Ongoing (interventions implemented as appropriate)    Description:  Pt to be seen a minimum of 3 out of 7 days a week for skilled OT  LTGs to be Met by 1/9/19  1. Pt will perform bed mobility with Mod (I)   2. Pt will perform commode t/fs with Mod (I)  3. Pt will perform UE and LE dressing with Supervision  4. Pt will tolerate (B) UE ROM exercises 1 x 20 reps                     Plan:  Patient to be seen 3 x/week to address the above listed problems via self-care/home management, therapeutic activities, therapeutic exercises  Plan of Care expires: 01/09/19  Plan of Care reviewed with: patient    OT G-codes  Functional Assessment Tool Used: [facility]  Score: 19    Leonarda  Kelley, PT/OT  01/03/2019